# Patient Record
Sex: FEMALE | Race: WHITE | HISPANIC OR LATINO | URBAN - METROPOLITAN AREA
[De-identification: names, ages, dates, MRNs, and addresses within clinical notes are randomized per-mention and may not be internally consistent; named-entity substitution may affect disease eponyms.]

---

## 2023-04-17 ENCOUNTER — APPOINTMENT (RX ONLY)
Dept: URBAN - METROPOLITAN AREA CLINIC 146 | Facility: CLINIC | Age: 58
Setting detail: DERMATOLOGY
End: 2023-04-17

## 2023-04-17 DIAGNOSIS — L20.89 OTHER ATOPIC DERMATITIS: ICD-10-CM

## 2023-04-17 DIAGNOSIS — D18.0 HEMANGIOMA: ICD-10-CM

## 2023-04-17 DIAGNOSIS — H01.00 UNSPECIFIED BLEPHARITIS: ICD-10-CM

## 2023-04-17 DIAGNOSIS — L30.4 ERYTHEMA INTERTRIGO: ICD-10-CM

## 2023-04-17 DIAGNOSIS — L81.4 OTHER MELANIN HYPERPIGMENTATION: ICD-10-CM

## 2023-04-17 DIAGNOSIS — Z12.83 ENCOUNTER FOR SCREENING FOR MALIGNANT NEOPLASM OF SKIN: ICD-10-CM

## 2023-04-17 DIAGNOSIS — L82.1 OTHER SEBORRHEIC KERATOSIS: ICD-10-CM

## 2023-04-17 PROBLEM — D18.01 HEMANGIOMA OF SKIN AND SUBCUTANEOUS TISSUE: Status: ACTIVE | Noted: 2023-04-17

## 2023-04-17 PROBLEM — L20.84 INTRINSIC (ALLERGIC) ECZEMA: Status: ACTIVE | Noted: 2023-04-17

## 2023-04-17 PROBLEM — H01.009 UNSPECIFIED BLEPHARITIS UNSPECIFIED EYE, UNSPECIFIED EYELID: Status: ACTIVE | Noted: 2023-04-17

## 2023-04-17 PROCEDURE — ? COUNSELING

## 2023-04-17 PROCEDURE — ? PRESCRIPTION

## 2023-04-17 PROCEDURE — ? ADDITIONAL NOTES

## 2023-04-17 PROCEDURE — 99214 OFFICE O/P EST MOD 30 MIN: CPT

## 2023-04-17 RX ORDER — PREDNISONE 10 MG/1
TABLET ORAL
Qty: 7 | Refills: 0 | Status: ERX | COMMUNITY
Start: 2023-04-17

## 2023-04-17 RX ORDER — HYDROCORTISONE 25 MG/G
CREAM TOPICAL BID
Qty: 30 | Refills: 0 | Status: ERX | COMMUNITY
Start: 2023-04-17

## 2023-04-17 RX ORDER — KETOCONAZOLE 20 MG/G
CREAM TOPICAL
Qty: 60 | Refills: 4 | Status: ERX | COMMUNITY
Start: 2023-04-17

## 2023-04-17 RX ADMIN — HYDROCORTISONE: 25 CREAM TOPICAL at 00:00

## 2023-04-17 RX ADMIN — PREDNISONE: 10 TABLET ORAL at 00:00

## 2023-04-17 RX ADMIN — KETOCONAZOLE: 20 CREAM TOPICAL at 00:00

## 2023-04-17 ASSESSMENT — LOCATION SIMPLE DESCRIPTION DERM
LOCATION SIMPLE: RIGHT ANTERIOR NECK
LOCATION SIMPLE: RIGHT BREAST
LOCATION SIMPLE: CHEST
LOCATION SIMPLE: RIGHT LIP
LOCATION SIMPLE: LEFT EYEBROW
LOCATION SIMPLE: ABDOMEN
LOCATION SIMPLE: LEFT LIP
LOCATION SIMPLE: RIGHT SHOULDER
LOCATION SIMPLE: UPPER BACK
LOCATION SIMPLE: RIGHT EYEBROW
LOCATION SIMPLE: LEFT BREAST
LOCATION SIMPLE: ABDOMEN
LOCATION SIMPLE: LEFT SHOULDER
LOCATION SIMPLE: RIGHT BREAST

## 2023-04-17 ASSESSMENT — LOCATION ZONE DERM
LOCATION ZONE: FACE
LOCATION ZONE: NECK
LOCATION ZONE: ARM
LOCATION ZONE: TRUNK
LOCATION ZONE: TRUNK
LOCATION ZONE: LIP

## 2023-04-17 ASSESSMENT — LOCATION DETAILED DESCRIPTION DERM
LOCATION DETAILED: LEFT MEDIAL BREAST 7-8:00 REGION
LOCATION DETAILED: SUBXIPHOID
LOCATION DETAILED: RIGHT POSTERIOR SHOULDER
LOCATION DETAILED: LEFT LOWER CUTANEOUS LIP
LOCATION DETAILED: RIGHT ANTERIOR SHOULDER
LOCATION DETAILED: LEFT POSTERIOR SHOULDER
LOCATION DETAILED: LEFT CENTRAL EYEBROW
LOCATION DETAILED: SUBXIPHOID
LOCATION DETAILED: RIGHT CLAVICULAR NECK
LOCATION DETAILED: RIGHT MEDIAL BREAST 5-6:00 REGION
LOCATION DETAILED: RIGHT LATERAL EYEBROW
LOCATION DETAILED: LOWER STERNUM
LOCATION DETAILED: RIGHT MEDIAL BREAST 5-6:00 REGION
LOCATION DETAILED: SUPERIOR THORACIC SPINE
LOCATION DETAILED: RIGHT LOWER CUTANEOUS LIP

## 2023-04-17 ASSESSMENT — ITCH NUMERIC RATING SCALE: HOW SEVERE IS YOUR ITCHING?: 7

## 2023-04-17 ASSESSMENT — BSA RASH: BSA RASH: 15

## 2023-04-17 NOTE — PROCEDURE: ADDITIONAL NOTES
Detail Level: Simple
Additional Notes: Patient to discuss Dupixent at follow up\\n\\nPatient has used Elidel for 6 weeks with no improvement
Render Risk Assessment In Note?: no

## 2023-05-02 ENCOUNTER — APPOINTMENT (RX ONLY)
Dept: URBAN - METROPOLITAN AREA CLINIC 146 | Facility: CLINIC | Age: 58
Setting detail: DERMATOLOGY
End: 2023-05-02

## 2023-05-02 DIAGNOSIS — L24 IRRITANT CONTACT DERMATITIS: ICD-10-CM

## 2023-05-02 DIAGNOSIS — L20.89 OTHER ATOPIC DERMATITIS: ICD-10-CM

## 2023-05-02 DIAGNOSIS — L81.4 OTHER MELANIN HYPERPIGMENTATION: ICD-10-CM

## 2023-05-02 PROBLEM — L24.9 IRRITANT CONTACT DERMATITIS, UNSPECIFIED CAUSE: Status: ACTIVE | Noted: 2023-05-02

## 2023-05-02 PROBLEM — L20.84 INTRINSIC (ALLERGIC) ECZEMA: Status: ACTIVE | Noted: 2023-05-02

## 2023-05-02 PROCEDURE — ? PRESCRIPTION MEDICATION MANAGEMENT

## 2023-05-02 PROCEDURE — 99213 OFFICE O/P EST LOW 20 MIN: CPT

## 2023-05-02 PROCEDURE — ? COUNSELING

## 2023-05-02 ASSESSMENT — LOCATION DETAILED DESCRIPTION DERM
LOCATION DETAILED: LEFT LOWER CUTANEOUS LIP
LOCATION DETAILED: RIGHT LOWER CUTANEOUS LIP
LOCATION DETAILED: LEFT RADIAL DORSAL HAND
LOCATION DETAILED: LEFT INFERIOR VERMILION LIP

## 2023-05-02 ASSESSMENT — LOCATION SIMPLE DESCRIPTION DERM
LOCATION SIMPLE: LEFT HAND
LOCATION SIMPLE: LEFT LIP
LOCATION SIMPLE: RIGHT LIP

## 2023-05-02 ASSESSMENT — LOCATION ZONE DERM
LOCATION ZONE: LIP
LOCATION ZONE: HAND

## 2025-02-13 ENCOUNTER — APPOINTMENT (OUTPATIENT)
Dept: URBAN - METROPOLITAN AREA CLINIC 142 | Facility: CLINIC | Age: 60
Setting detail: DERMATOLOGY
End: 2025-02-13

## 2025-02-13 DIAGNOSIS — L81.4 OTHER MELANIN HYPERPIGMENTATION: ICD-10-CM

## 2025-02-13 DIAGNOSIS — L82.1 OTHER SEBORRHEIC KERATOSIS: ICD-10-CM

## 2025-02-13 DIAGNOSIS — L20.89 OTHER ATOPIC DERMATITIS: ICD-10-CM

## 2025-02-13 DIAGNOSIS — L85.3 XEROSIS CUTIS: ICD-10-CM

## 2025-02-13 DIAGNOSIS — D18.0 HEMANGIOMA: ICD-10-CM

## 2025-02-13 PROBLEM — D18.01 HEMANGIOMA OF SKIN AND SUBCUTANEOUS TISSUE: Status: ACTIVE | Noted: 2025-02-13

## 2025-02-13 PROCEDURE — 99213 OFFICE O/P EST LOW 20 MIN: CPT

## 2025-02-13 PROCEDURE — ? OTC TREATMENT REGIMEN

## 2025-02-13 PROCEDURE — ? COUNSELING

## 2025-02-13 PROCEDURE — ? SUNSCREEN RECOMMENDATIONS

## 2025-02-13 PROCEDURE — ? PRESCRIPTION

## 2025-02-13 RX ORDER — MUPIROCIN 20 MG/G
OINTMENT TOPICAL
Qty: 22 | Refills: 1 | Status: ERX | COMMUNITY
Start: 2025-02-13

## 2025-02-13 RX ORDER — CLOBETASOL PROPIONATE 0.5 MG/G
CREAM TOPICAL
Qty: 60 | Refills: 3 | Status: ERX | COMMUNITY
Start: 2025-02-13

## 2025-02-13 RX ORDER — HYDROQUINONE 4 %
CREAM (GRAM) TOPICAL QHS
Qty: 28.4 | Refills: 3 | Status: ERX | COMMUNITY
Start: 2025-02-13

## 2025-02-13 RX ADMIN — CLOBETASOL PROPIONATE: 0.5 CREAM TOPICAL at 00:00

## 2025-02-13 RX ADMIN — MUPIROCIN: 20 OINTMENT TOPICAL at 00:00

## 2025-02-13 RX ADMIN — Medication: at 00:00

## 2025-02-13 ASSESSMENT — LOCATION ZONE DERM
LOCATION ZONE: LEG
LOCATION ZONE: TRUNK
LOCATION ZONE: ARM

## 2025-02-13 ASSESSMENT — LOCATION SIMPLE DESCRIPTION DERM
LOCATION SIMPLE: RIGHT FOREARM
LOCATION SIMPLE: RIGHT UPPER BACK
LOCATION SIMPLE: ABDOMEN
LOCATION SIMPLE: LEFT UPPER ARM
LOCATION SIMPLE: LEFT UPPER BACK
LOCATION SIMPLE: RIGHT LOWER BACK
LOCATION SIMPLE: LEFT THIGH
LOCATION SIMPLE: RIGHT PRETIBIAL REGION

## 2025-02-13 ASSESSMENT — LOCATION DETAILED DESCRIPTION DERM
LOCATION DETAILED: LEFT PROXIMAL POSTERIOR UPPER ARM
LOCATION DETAILED: LEFT MEDIAL UPPER BACK
LOCATION DETAILED: EPIGASTRIC SKIN
LOCATION DETAILED: LEFT ANTERIOR DISTAL THIGH
LOCATION DETAILED: RIGHT PROXIMAL DORSAL FOREARM
LOCATION DETAILED: RIGHT INFERIOR MEDIAL MIDBACK
LOCATION DETAILED: RIGHT SUPERIOR UPPER BACK
LOCATION DETAILED: RIGHT MID-UPPER BACK
LOCATION DETAILED: PERIUMBILICAL SKIN
LOCATION DETAILED: RIGHT DISTAL PRETIBIAL REGION
LOCATION DETAILED: RIGHT PROXIMAL PRETIBIAL REGION

## 2025-02-13 NOTE — PROCEDURE: OTC TREATMENT REGIMEN
Patient Specific Otc Recommendations (Will Not Stick From Patient To Patient): Cerave anti itch
Detail Level: Zone

## 2025-06-12 ENCOUNTER — APPOINTMENT (OUTPATIENT)
Dept: URBAN - METROPOLITAN AREA CLINIC 146 | Facility: CLINIC | Age: 60
Setting detail: DERMATOLOGY
End: 2025-06-12

## 2025-06-12 DIAGNOSIS — Z41.9 ENCOUNTER FOR PROCEDURE FOR PURPOSES OTHER THAN REMEDYING HEALTH STATE, UNSPECIFIED: ICD-10-CM

## 2025-06-12 PROCEDURE — ? ADDITIONAL NOTES

## 2025-06-12 NOTE — PROCEDURE: ADDITIONAL NOTES
Detail Level: Simple
Additional Notes: Patient came in for a cosmetic consult, a quote was given \\n\\nSclerotherapy Right leg $750\\nSclerotherapy Left leg $750\\n\\nPatient did schedule appointment and she paid $750 in full for the right leg 6/12/25
Render Risk Assessment In Note?: no

## 2025-07-09 ENCOUNTER — APPOINTMENT (OUTPATIENT)
Dept: URBAN - METROPOLITAN AREA CLINIC 146 | Facility: CLINIC | Age: 60
Setting detail: DERMATOLOGY
End: 2025-07-09

## 2025-07-09 DIAGNOSIS — Z41.9 ENCOUNTER FOR PROCEDURE FOR PURPOSES OTHER THAN REMEDYING HEALTH STATE, UNSPECIFIED: ICD-10-CM

## 2025-07-09 PROCEDURE — ? SCLEROTHERAPY (COSMETIC)

## 2025-07-09 PROCEDURE — ? PHLEBECTOMY

## 2025-07-09 PROCEDURE — ? ADDITIONAL NOTES

## 2025-07-09 ASSESSMENT — LOCATION ZONE DERM: LOCATION ZONE: LEG

## 2025-07-09 ASSESSMENT — LOCATION DETAILED DESCRIPTION DERM
LOCATION DETAILED: LEFT ANTERIOR LATERAL DISTAL THIGH
LOCATION DETAILED: LEFT ANTERIOR DISTAL THIGH
LOCATION DETAILED: LEFT PROXIMAL PRETIBIAL REGION
LOCATION DETAILED: LEFT DISTAL POSTERIOR THIGH
LOCATION DETAILED: LEFT POPLITEAL SKIN
LOCATION DETAILED: LEFT PROXIMAL CALF
LOCATION DETAILED: LEFT LATERAL DISTAL PRETIBIAL REGION

## 2025-07-09 ASSESSMENT — LOCATION SIMPLE DESCRIPTION DERM
LOCATION SIMPLE: LEFT POSTERIOR THIGH
LOCATION SIMPLE: LEFT PRETIBIAL REGION
LOCATION SIMPLE: LEFT CALF
LOCATION SIMPLE: LEFT THIGH
LOCATION SIMPLE: LEFT POPLITEAL SKIN

## 2025-07-09 NOTE — PROCEDURE: ADDITIONAL NOTES
Additional Notes: Consent was obtained with risks, benefits, and alternatives discussed for the documented procedures. Photographs were taken. Preoperative medications were taken as above. This therapy was medically necessary because the patient has failed a 3 month trial of conservative therapy (such as: exercise, periodic leg elevation, weight loss, compressive therapy and avoidance of prolonged immobility) and no evidence of aneurysm in the target segment. \\n\\nMicro-Phlebectomy alleviates refractory painful high venous pressure by removing portions of the incompetent vein. This procedure is to be done on varicose veins which are nonresponsive to Endovenous Laser Treatment (EVLT) or Ultrasound Guided Sclerotherapy (USGS). This is necessary to treat vessels that are involved in transmission of high venous pressure, which can cause the patient to continue to have pain and the veins may clot. \\n\\nThe procedure was explained in depth. Alternative treatment options were discussed. Potential complications including but, not limited to infections, bleeding, DVT, pulmonary embolism, skin burns, discoloration, nerve injury, arterial injury, ulcers, and paresthesias were discussed. Questions were sought and answered. Patient expressed understanding. Patient agreed to proceed. Venous branches were marked on the  leg while the patient was in a standing position using a surgical marker.  Area is prepped with hibiclens soap. Sterile drapes placed. Tumescent solution (450 ml 0.9 NS, 50 ml lidocaine 1% with epinephrine and 16 ml of Sodium Bicarbonate) was injected sub-dermally 2 inches around the marked areas .  Small incisions were made at the previously marked veins using a #11 blade. Small incisions were made at the previously marked veins using a #11 blade. A micro-phlebectomy hook was then used to avulse the veins.\\n\\nLeg washed with antibacterial soap & water, dried, applied Dermaka cream to treated areas, compression foam pads, ABD pads, kerlix, coban, ACE wraps, tape, & secured with safety pins. Patient tolerated the procedure well and left the operating room ambulating in stable condition. Post-operative instructions given and reviewed with patient verbally and in writing. Instructed to take Ibuprofen 600mg every 4-6 hours or Aleve 500mg every 12 hours or Tylenol 500mg every 6 hours for one week with food, walk for 10 minutes every hour the patient is awake. Patient to remove dressings in 24/48 hours and wear thigh high compression stockings for 7 days if tolerated. No heavy lifting over 25 lbs and no strenuous exercising. The patient was given written instructions with a phone number to contact with any issues or concerns. Patient verbalized understanding of all instructions. Questions encouraged and answered. Patient is to return to clinic in 5-7 days for follow-up post treatment ultrasound.\\n\\nSclerotherapy alleviates painful high venous pressure by causing thrombosis of an incompetent vein. Thrombosis blocks accessory blood flow which, if left treated, dilates connecting veins, resulting in painful and enlarged varicosities. Thrombosis is achieved using sclerosing agents such as Sotradecol or Polidocanol. By using these sclerosants, endothelial cells are destroyed which, causes thrombosis and eventual fibrosis of the vein. This is necessary to treat vessels that are involved in transmission of high venous pressure, which can be painful and clot if left untreated. Sclerotherapy treatment today to reticular and spider veins.  sclerotherapy treatment was explained in depth. Alternative treatment options were discussed. Potential complications including, but not limited to infections, bleeding, DVT pulmonary embolism, skin burns, discoloration, nerve injury, arterial injury, ulcers, and paresthesia’s were discussed.
Detail Level: Simple
Render Risk Assessment In Note?: no

## 2025-07-09 NOTE — PROCEDURE: SCLEROTHERAPY (COSMETIC)
Sclerosant (A) %: 0.20
Sclerosant Volume (Cc): 10
Lot # [id number]: [id number]
Sclerosant Volume (Cc): 8
Post-Care Instructions: Compression for 3 weeks is critical to optimize your recovery and results. Compliance with compression helps to prevent blood clots and minimizes pain, swelling, bruising, skin discoloration (staining) and the recurrence of vessels.  Materials to gather for your wound care (all available over the counter):  Compression stockings x 2 pairs, 4 x 4 gauze, Comprilan wrap: 8 cm and 10 cm width wrap, Medical adhesive tape. Compression and Wound care;  Leg compression for 3 weeks is mcl[place] to your success and safety. Compression at night is only needed the first day. After that, compression is needed only during waking hours.  However, if your leg feels better with compression at night, then you may continue compression at night as tolerated.  After the sclerotherapy procedure, 2 layers compression will be placed.  1. On the skin, folded or flat gauze will cover the treated areas. 2. A compression wrap (Comprilan) will be wrapped around your leg over the gauze. Once the compression wrap is in place, a compression stocking will be worn. This two layer  compression (wrap plus stocking) should be worn for the first 24 hours if tolerated.       3. After 24 hours, remove all compressions and dressings and just wear the compression stockings only during waking hours.  You will need to wear compression stockings for three weeks after your procedure, unless your physician instructs you otherwise.  Activity:  It is important NOT to be sitting or lying down for several hours after surgery. You may begin walking immediately after surgery. This is good for you, but take it easy.  For 2 days after treatment: Avoid aerobic exercise, weight training, and all other types of exertion that increase your breathing and pulse rates.  Do not get a tan for one month after sclerotherapy. Tanning increases your risk of skin discoloration. Bathing: After 24 hours, you may shower or bathe in tepid water, but keep the compression stocking on. Avoid immersion in hot tubs.      For pain or discomfort: You may take acetaminophen (TylenolTM, Extra-Strength TylenolTM or DatrilTM) as directed. Do not use aspirin, products containing aspirin, or ibuprofen (for example AdvilTM or MotrinTM) for five days after your surgery, unless approved by your physician.       Dietary restrictions: Do not drink alcoholic beverages for two days after your sclerotherapy procedure. Possible side effects following sclerotherapy:  After sclerotherapy, mild swelling is expected. The injection sites may also become bruised or gray. You may also experience one or more of the following side effects, which almost always go away within one to four months:  Darkening of the injected veins.  Brownish staining of the skin.  Small clotted vessels under the surface of the skin that you can feel.  Bruising of the injection sites:   What to do about bruising - This will resolve within 2-3 weeks. If you wish the bruising to disappear sooner, then applying Arnicare cream (over the counter, health food stores) will help.  What to do if you feel small clotted vessels under the surface of the skin:  Call us for a follow up appointment. These small clots can be drained through a small nick. Draining these small clots will help you heal faster and with less discoloration. Contact your physician if you experience any of the following:  Treated areas become increasingly sore, tender, red or warm.  Acetaminophen does not relieve your discomfort. Injection sites turn black or the skin around the site breaks down. Ulceration of the injection sites. You develop blisters from the tape. You develop significant swelling or pain in the leg. Darkening of large areas of the skin or foot.
Detail Level: Detailed
Number Of Injections (Sclerosant 3): 0
Consent was obtained with risks, benefits, and alternatives discussed for the above procedures.  Photographs were taken.
Disposition: Compression stockings and short stretch elastic bandages were placed postoperatively.
Price (Use Numbers Only, No Special Characters Or $): 074
Expiration Date A (Month Year): 12/25

## 2025-07-09 NOTE — PROCEDURE: PHLEBECTOMY
Number Of Incisions Per Microphlebectomy: 10
Epidermal Sutures (Optional): 5-0 Ethilon
Disposition: Compression dressings were placed and stockings. Wound care instructions were given, verbal and written.
Post-Care Instructions: Compression is critical to optimize your recovery and results. Compression helps to prevent blood clots and minimizes pain, swelling, bruising and bleeding.       \nMaterials to gather for your wound care (all available over the counter)-       \n1. Compression stockings x 2 pairs      \n2. Coban or Comprilan wraps (ACE Wraps are not a good substitute) x 2      \n3. Hibiclens solution      \n4. Telfa pads (nonstick gauze)      \n5. 4 x 4 gauze      \n6. Aquaphor or Vaseline ointment       \n7. Medical adhesive tape      \n___1. If you have had sedation, you must have a  with you for the first 24 hours after surgery and must not operate any motor vehicles or equipment that requires alertness and coordination.        \n___2. Activity       \n - It is important NOT to be sitting or lying down for several hours after surgery. You may begin walking immediately after surgery.       \n - As part of your surgery, an anesthetic fluid was used to numb your skin. Most of this fluid is safely absorbed by your body but some of the fluid may drain from the small skin incisions.       \n - For 2 days after treatment: Avoid aerobic exercise, weight training, and all other types of exertion that increase your breathing and pulse rates.       \n___3. Compression and Wound care; Leg compression is vital to your success and safety. It is needed for 2 weeks minimum.       \nAfter your procedure, we will place gauze over your treated site(s) followed by a wrap (Comprilan or Coban), which is left in place for 24 hours. Your elastic stocking is then worn over the wrap.  At bedtime, you may remove the stocking to sleep but keep the inner wrap (Comprilan or Coban) on.         \na. After 24 hours, remove the wrap and gauze. Make sure you are lying down.       \nb. If you experience bleeding from the surgery site, place gauze over the area and apply firm pressure for 15 minutes without lifting. You may repeat this once. If bleeding persists, contact your physician.       \nc. Change your dressing once daily.  Lie down and remove the stockings and inner Comprilan wrap but not the gauze.  Take a shower with the gauze on and get it wet.       \nd. Remove the wet gauze in the shower and clean the incisions with diluted Hibiclens solution. Finish showering and pat dry.      \ne. Place some ointment (Aquaphor, Vaseline) over any incision sites and apply Telfa or non-stick gauze pad. Secure the Telfa or non-stick gauze pad with medical tape.         \nf. While lying down, rewrap your treated leg with Coban or Comprilan  followed by your stocking. The inner wrap and outer stocking combination should be worn for the first 7 days.        \ng. After the first week, you may discontinue the inner wrap and only wear stockings for another 7 days. The longer you wear the stockings beyond the minimum 2 weeks, the faster you will heal.      \n___4. Bathing       \n - Do not bathe for 24 hours. After 24 hours, you may shower in warm (not hot) water.       \n - Clean your surgery sites during showering (3e) and when you are finished bathing, pat your leg dry with a towel and repeat wound care instructions in 3f and 3g.       \n - For one week after treatment:  Avoid immersion in hot tubs      \n___5. Discomfort;  Any pain or discomfort should decrease with each day after surgery.       \n - You may take acetaminophen (Tylenol, Extra-Strength Tylenol or Datril) or prescribed medicines as directed. If your pain is not better, then please contact your physician      \n - Do not use aspirin, products containing aspirin, or ibuprofen (for example AdvilTM or MotrinTM) for five days after your surgery, unless approved by your physician.       \n___6. Dietary restrictions;  Do not drink alcoholic beverages for two days after your phlebectomy.       \nWhat to expect after microphlebectomy       \n - Bruising and swelling at the site of the surgery are expected and is usually gone by 2 weeks.       \n - When to contact your physician Contact your physician if you experience any of the following:       \n - Treated areas become increasingly sore, tender, red or warm.       \n - Incision sites have pus like drainage      \n - Acetaminophen does not relieve your discomfort       \n - Fever higher than 100.4 degrees Fahrenheit (38 degrees Celsius)
Estimated Blood Loss (Cc): minimal
Medical Necessity Information: [employer] Guidelines vary from payer to payer. Please check with your payer's policy to determine medical necessity.
Medical Necessity Clause: This therapy was medically necessary because the patient has
Hemostasis: Pressure
Detail Level: Detailed
Consent was obtained with risks, benefits, and alternatives discussed for the above procedures.  Photographs were taken. Preoperative medications were taken as above.